# Patient Record
Sex: MALE | Race: WHITE | NOT HISPANIC OR LATINO | Employment: FULL TIME | URBAN - METROPOLITAN AREA
[De-identification: names, ages, dates, MRNs, and addresses within clinical notes are randomized per-mention and may not be internally consistent; named-entity substitution may affect disease eponyms.]

---

## 2022-12-09 ENCOUNTER — TELEPHONE (OUTPATIENT)
Dept: CARDIOLOGY | Facility: MEDICAL CENTER | Age: 57
End: 2022-12-09
Payer: COMMERCIAL

## 2022-12-09 NOTE — TELEPHONE ENCOUNTER
Spoke to patient in regards to obtaining records for NP appointment with Yaneth Melgar. Per patient he was treated by a cardiologist in [place]. Confirmed all recent notes, labs, and cardiac imaging are in Epic. Confirmed with patient appointment time, location, and date.    Will request all cardiac records including lab work from [facility].

## 2022-12-14 ENCOUNTER — TELEPHONE (OUTPATIENT)
Dept: CARDIOLOGY | Facility: MEDICAL CENTER | Age: 57
End: 2022-12-14

## 2022-12-14 ENCOUNTER — OFFICE VISIT (OUTPATIENT)
Dept: CARDIOLOGY | Facility: MEDICAL CENTER | Age: 57
End: 2022-12-14
Payer: COMMERCIAL

## 2022-12-14 VITALS
RESPIRATION RATE: 16 BRPM | SYSTOLIC BLOOD PRESSURE: 131 MMHG | HEIGHT: 74 IN | OXYGEN SATURATION: 95 % | DIASTOLIC BLOOD PRESSURE: 80 MMHG | HEART RATE: 90 BPM | BODY MASS INDEX: 33.88 KG/M2 | WEIGHT: 264 LBS

## 2022-12-14 DIAGNOSIS — E78.00 PURE HYPERCHOLESTEROLEMIA: ICD-10-CM

## 2022-12-14 DIAGNOSIS — Z95.5 HISTORY OF HEART ARTERY STENT: ICD-10-CM

## 2022-12-14 DIAGNOSIS — I25.9 ISCHEMIC HEART DISEASE DUE TO CORONARY ARTERY OBSTRUCTION (HCC): ICD-10-CM

## 2022-12-14 DIAGNOSIS — I25.10 CORONARY ARTERY DISEASE INVOLVING NATIVE CORONARY ARTERY OF NATIVE HEART WITHOUT ANGINA PECTORIS: ICD-10-CM

## 2022-12-14 DIAGNOSIS — I24.0 ISCHEMIC HEART DISEASE DUE TO CORONARY ARTERY OBSTRUCTION (HCC): ICD-10-CM

## 2022-12-14 DIAGNOSIS — I10 PRIMARY HYPERTENSION: ICD-10-CM

## 2022-12-14 DIAGNOSIS — Z86.79 HISTORY OF HEART FAILURE: ICD-10-CM

## 2022-12-14 DIAGNOSIS — E11.00 TYPE 2 DIABETES MELLITUS WITH HYPEROSMOLARITY WITHOUT COMA, WITHOUT LONG-TERM CURRENT USE OF INSULIN (HCC): ICD-10-CM

## 2022-12-14 PROCEDURE — 99205 OFFICE O/P NEW HI 60 MIN: CPT | Performed by: NURSE PRACTITIONER

## 2022-12-14 PROCEDURE — 93000 ELECTROCARDIOGRAM COMPLETE: CPT | Performed by: INTERNAL MEDICINE

## 2022-12-14 RX ORDER — PANTOPRAZOLE SODIUM 40 MG/1
TABLET, DELAYED RELEASE ORAL
COMMUNITY
Start: 2022-12-12

## 2022-12-14 RX ORDER — CARVEDILOL 12.5 MG/1
12.5 TABLET ORAL 2 TIMES DAILY WITH MEALS
COMMUNITY
Start: 2022-12-12

## 2022-12-14 RX ORDER — SPIRONOLACTONE 25 MG/1
12.5 TABLET ORAL DAILY
COMMUNITY
Start: 2022-12-12

## 2022-12-14 RX ORDER — CLOPIDOGREL BISULFATE 75 MG/1
75 TABLET ORAL DAILY
COMMUNITY
Start: 2022-12-12 | End: 2023-01-26

## 2022-12-14 RX ORDER — SACUBITRIL AND VALSARTAN 24; 26 MG/1; MG/1
1 TABLET, FILM COATED ORAL 2 TIMES DAILY
COMMUNITY
Start: 2022-12-12 | End: 2023-04-06

## 2022-12-14 RX ORDER — ATORVASTATIN CALCIUM 80 MG/1
80 TABLET, FILM COATED ORAL DAILY
COMMUNITY
Start: 2022-12-12

## 2022-12-14 RX ORDER — FUROSEMIDE 40 MG/1
40 TABLET ORAL DAILY
COMMUNITY
Start: 2022-12-12

## 2022-12-14 NOTE — PROGRESS NOTES
Cardiology New Consultation Note    Date of note:    12/14/2022  Primary Care Provider: NOMAN Vaca    Name:             Yaniv Garvey  YOB: 1965  MRN:               [id number]    CC: Hx of cardiac stents, CHF     Patient HPI:   Yaniv Garvey is a 57 y.o. male with current medical problems including HLD, DM2, atherosclerotic heart disease with Hx of 4 cardiac stents in 2019, HTN, and ischemic cardiomyopathy    Patient endorses medication compliance.  BP slightly elevated today at 144/90.  At home he states his BP meds are low 130s/80s.  He does have recent labs that he got from PCP at [facility], hemoglobin A1c was 11.3 in September, has not had repeat labs yet since.  States his blood sugars have been in the high 300s-400s recently.    Had cardiac care at [facility] [place], Dr. Ruano, had 4 cardiac stents in 2019. Was told he had CHF, not sure of EF.    He denies palpitations, chest pain, shortness of breath, dyspnea on exertion, dizziness or syncopal episodes, orthopnea, PND, lower extremity swelling, and recent weight gain. Did have swelling in legs.     He still remains of Plavix. No bleeding in blood in urine/stool.    Cardiovascular Risk Factors:  1. Smoking status: never  2. Type II Diabetes Mellitus:  No results found for: HBA1C  3. Hypertension: Yes  4. Dyslipidemia: Yes No results found for: CHOLSTRLTOT, LDL, HDL, TRIGLYCERIDE  5. Family history of early Coronary Artery Disease in a first degree relative (Male less than 55 years of age; Female less than 65 years of age): Father @ 63  6.  Obesity and/or Metabolic Syndrome: BMI 33  7.  Sedentary lifestyle: 15 mins per day on bike, walks with dog    Review of systems:  All others systems reviewed and negative except for what is outlined in the above HPI    No past medical history on file.  No past surgical history on file.  No family history on file.  Social History     Socioeconomic History     "Marital status:      Spouse name: Not on file    Number of children: Not on file    Years of education: Not on file    Highest education level: Not on file   Occupational History    Not on file   Tobacco Use    Smoking status: Never    Smokeless tobacco: Never   Substance and Sexual Activity    Alcohol use: Yes     Comment: 2-3 weekly    Drug use: Never    Sexual activity: Not on file   Other Topics Concern    Not on file   Social History Narrative    Not on file     Social Determinants of Health     Financial Resource Strain: Not on file   Food Insecurity: Not on file   Transportation Needs: Not on file   Physical Activity: Not on file   Stress: Not on file   Social Connections: Not on file   Intimate Partner Violence: Not on file   Housing Stability: Not on file     No Known Allergies  Current Outpatient Medications   Medication Sig Dispense Refill    atorvastatin (LIPITOR) 80 MG tablet Take 80 mg by mouth every day.      carvedilol (COREG) 12.5 MG Tab Take 12.5 mg by mouth 2 times a day with meals.      clopidogrel (PLAVIX) 75 MG Tab Take 75 mg by mouth every day.      furosemide (LASIX) 40 MG Tab Take 40 mg by mouth every day.      metFORMIN (GLUCOPHAGE) 500 MG Tab 2 tablets am. 1 tablet pm      pantoprazole (PROTONIX) 40 MG Tablet Delayed Response TAKE 1 TABLET BY MOUTH ONCE DAILY WITH BREAKFAST      ENTRESTO 24-26 MG Tab Take 1 Tablet by mouth 2 times a day.      spironolactone (ALDACTONE) 25 MG Tab Take 12.5 mg by mouth every day.      Omega-3 Fatty Acids (FISH OIL PO) Take  by mouth.      MAGNESIUM PO Take  by mouth.      aspirin EC (ECOTRIN) 81 MG Tablet Delayed Response Take 81 mg by mouth every day.      Empagliflozin 10 MG Tab Take 1 Tablet by mouth every day. 30 Tablet 3     No current facility-administered medications for this visit.     Physical Exam:  Ambulatory Vitals  /80   Pulse 90   Resp 16   Ht 1.88 m (6' 2\")   Wt 120 kg (264 lb)   SpO2 95%    BP Readings from Last 4 " Encounters:   22 131/80       Weight/BMI: Body mass index is 33.9 kg/m².  Wt Readings from Last 4 Encounters:   22 120 kg (264 lb)       General: No apparent distress. Well nourished.   Neck: No JVD. No caroid bruits, trachea midline  Lungs: CTAB. Normal effort, without crackles/rhonchi, no wheezing  Heart: RRR. Normal S1/S2, no murmur, no rub. no lower extremity edema. 2+ radial pulses, 2+ DT pulses  Ext: No clubbing or cyanosis.  Abdomen: soft, non tender, non distended, no rosario hepatomegaly.  Neurological: No focal deficits, no facial asymmetry.  Normal speech.  Psychiatric: Appropriate affect, alert and oriented x 4.   Skin: Warm and dry, no rash.    Lab Data Review:  No results found for: CHOLSTRLTOT, LDL, HDL, TRIGLYCERIDE    No results found for: SODIUM, POTASSIUM, CHLORIDE, CO2, GLUCOSE, BUN, CREATININE, BUNCREATRAT, GLOMRATE  No results found for: ALKPHOSPHAT, ASTSGOT, ALTSGPT, TBILIRUBIN   No results found for: WBC    Cardiac Imaging and Procedures Review:      EKG 22: My Personal interpretation reveals SR 89    Assessment and Clinical Decision Makin. Ischemic heart disease due to coronary artery obstruction (HCC)  Empagliflozin 10 MG Tab    Comp Metabolic Panel    TSH WITH REFLEX TO FT4    Lipid Profile    CBC WITHOUT DIFFERENTIAL      2. Coronary artery disease involving native coronary artery of native heart without angina pectoris  Empagliflozin 10 MG Tab    Comp Metabolic Panel    TSH WITH REFLEX TO FT4    Lipid Profile    CBC WITHOUT DIFFERENTIAL      3. History of heart artery stent        4. Pure hypercholesterolemia  EKG    Empagliflozin 10 MG Tab    Comp Metabolic Panel    TSH WITH REFLEX TO FT4    Lipid Profile    CBC WITHOUT DIFFERENTIAL      5. Primary hypertension  Empagliflozin 10 MG Tab    Comp Metabolic Panel    TSH WITH REFLEX TO FT4    Lipid Profile    CBC WITHOUT DIFFERENTIAL      6. Type 2 diabetes mellitus with hyperosmolarity without coma, without long-term  current use of insulin (HCC)  Empagliflozin 10 MG Tab    Comp Metabolic Panel    TSH WITH REFLEX TO FT4    Lipid Profile    CBC WITHOUT DIFFERENTIAL    Referral to Endocrinology    HEMOGLOBIN A1C (Glycohemoglobin GHB Total/A1C with MBG Estimate)        The following treatment plan was discussed    Ischemic heart disease due to coronary obstruction  CAD  History of heart artery stent  Pure hypercholesterolemia  -No records were requested prior to this appointment  -Request records from [place], [place], cardiology  -Daily aspirin 81 mg daily  -May be able to stop Plavix 75 mg given that he has been on this longer than 1 year, wait to review cardiology records for recommendations  -High intensity atorvastatin 80 mg  -DL goal < 70  -Obtain labs CMP and lipids    Hx heart failure  -No current heart failure symptoms, euvolemic per exam  -Patient unsure of degree of heart failure  -Awaiting records from cardiology, per above  -New current GDMT to include BB, Arni, spironolactone, furosemide  -Initiate Jardiance 10mg given DM2, labs in 1 month    HTN  -Home monitoring, get BP cuff to next appointment  -Follow-up with BPs in 6 weeks  -If home BP still > 130/80 can increase Entresto    DM2  -Requesting referral to endocrinology, placed  -Obtain hemoglobin A1c prior to appointment    Plan reviewed in detail with the patient, verbalizes understanding and is in agreement.  Pt is to follow up with myself in 6 weeks  Encouraged Pt to follow up with us over the phone or electronically using my [employer] as cardiac issues/concerns arise.      PLEASE NOTE: This dictation was created using voice recognition software. I have made every reasonable attempt to correct obvious errors, but I expect that there are errors of grammar and possibly content that I did not discover before finalizing the note.       ANGELICA Boggs.   [facility]  [phone]    Collaborating Physician:   Aric

## 2022-12-15 LAB — EKG IMPRESSION: NORMAL

## 2022-12-15 NOTE — TELEPHONE ENCOUNTER
Faxed STAT records request to previous Cardiologist in [place].  Dr. Ruano     Ph # [phone]  fax# [phone]

## 2022-12-15 NOTE — PATIENT INSTRUCTIONS
Bring in BP cuff to next visit to double check   Labs in 1 month   See me in 6 weeks   Call [phone] to find a new PCP with [facility]    Checking Blood Pressure:  -Blood pressure cuff, spend in the $40-65, with good return policy  -It should be automatic, upper arm, measure your arm to get the correct size, probably adult Large but your arm should be under 16.5 cm. If you need an XL cuff, you will have to have it special ordered from a pharmacy or durable medical equipment company.  -Put the cuff in place, rest arm on table near height of your heart, sit quietly for 5 min, legs uncrossed, with back support, then take your blood pressure, write it down, keep a log  -Check no more than 1 time day, maybe 4-5 times per week, try different times of day.  -Can bring your cuff to at least one appointment where it can be calibrated to a manual cuff if you are concerned.  -Goal blood pressure is at least under 130/80, ideally under 120/80.  If you think your BP is overall too high, let us know in the office, we can adjust medications, can use Iizuu or call the [place] office: [phone].    Salt=sodium=sea salt, guidelines say stay under 2,500 mg daily but I ask for under 4,000 mg daily.  Get salt smart, start looking at labels, count it up.  Salt is hidden in everything, salad dressing, sauces, cheese, most canned food, any processed meat.      A - Antiplatelet - Aspirin 81 mg daily or other antiplatelets (clopidogrel (PLAVIX), prasrugrel (EFFIENT), ticagrelor (BRILINTA) reduce your risk.   B - Blood Pressure Control - reduces your risk or heart attack and stroke.  C - Cholesterol Management - statins reduce your risk; for those that are intolerant to statins, there are alternatives.  D - Diet - Cardiac rehab diets, Cardiosmart.org.  E - Exercise - at least 5 hours of moderate exercise weekly  (typical brisk walking or similar activity).  F - Fats - VASCEPA,  or Fish oil (if Vascepa too expensive) for elevated triglycerides  (REDUCE IT trial showed reduction from 22% 5 year MACE to 17%).  G - Good Vibes - meditation, exercise, yoga, mindfulness, stress reduction.  H - Heart Failure - betablockers, sucubatril (ENTRESTO) 16% less risk of dying over 3 years, spironolactone, dapagliflozin (FARXIGA) 17% less risk of dying over 2 years, CRT +/- ICD.  I - Inflammation - Colchicine in the LoDoCo2 study in 2020 reduced the risk of heart attack by 30% in 2.5 year follow up.  R - Rehab - Cardiac rehab reduces risk of dying by 13-24% and need to go to the hospital by 30% within the first year.  S - Smoking - never smoke, if you do smoke ask for help to quit for good.  T - Type II Diabetes - pills empagliflozin (JARDIANCE) 38% less risk of dying over 4 years, and/or weekly injections liraglutide (Victoza), semaglutide (Ozempic), dulaglutide (Trulicity) ~26% less risk of MACE in 2 years.  V - Vaccines - Annual flu shot and COVID vaccine reduces the risk of serious cardiovascular complications from these deadly infections.  W - Weight - maintain a healthy weight.      Work on at least 1.5 - 5 hours a week of moderate exercise    Please look into the following diets and incorporate them into your diet  LOW SALT DIET   KEEP YOUR SODIUM EQUAL TO CALORIES AND NO MORE THAN DOUBLE THE CALORIES FOR A LOW SALT DIET    Cardiosmart.org - great resource for American College of Cardiology on heart disease prevention and treatment    FOR TREATMENT OR PREVENTION OF CORONARY ARTERY DISEASE  These three programs are approved by Medicare/Insurers for those with heart disease  [place] - [facility]  Dr. Huynh's Program for Reversing Heart Disease - José Miguel Gayle Cardiologist vegetarian-based  [facility] - [place]-based mind-body Program    This is a commonly referenced Program  Dr Montiel - [place] [facility] [employer] (book and documentary) - vegetarian-based    FOR TREATMENT OF BLOOD PRESSURE  DASH DIET - American  Heart Association for treatment of HYPERTENSION    FOR TREATMENT OF BAD CHOLESTEROL/FATS  REDUCE PROCESSED SUGAR AS MUCH AS POSSIBLE  INCREASE WHOLE GRAINS/VEGETABLES  INCREASE FIBER    Lowering total cholesterol and LDL (bad) cholesterol:  - Eat leaner cuts of meat, or eliminate altogether if possible red meat, and frequently substitute fish or chicken.  - Limit saturated fat to no more than 7-10% of total calories no more than 10 g per day is recommended. Some sources of saturated fat include butter, animal fats, hydrogenated vegetable fats and oils, many desserts, whole milk dairy products.  - Replaced saturated fats with polyunsaturated fats and monounsaturated fats. Foods high in monounsaturated fat include nuts, canola oil, avocados, and olives.  - Limit trans fat (processed foods) and replaced with fresh fruits and vegetables  - Recommend nonfat dairy products  - Increase substantially the amount of soluble fiber intake (legumes such as beans, fruit, whole grains).  - Consider nutritional supplements: plant sterile spreads such as Benecol, fish oil,  flaxseed oil, omega-3 acids capsules 1000 mg twice a day, or viscous fiber such as Metamucil  - Attain ideal weight and regular exercise (at least 30 minutes per day of moderate exercise)  ASK ABOUT STATIN OR NON STATIN MEDICATION TO REDUCE YOUR LDL AND HEART RISK    Lowering triglycerides:  - Reduce intake of simple sugar: Desserts, candy, pastries, honey, sodas, sugared cereals, yogurt, Gatorade, sports bars, canned fruit, smoothies, fruit juice, coffee drinks  - Reduced intake of refined starches: Refined Pasta, most bread  - Reduce or abstain from alcohol  - Increase omega-3 fatty acids: Sylvia, Trout, Mackerel, Herring, Albacore tuna and supplements  - Attain ideal weight and regular exercise (at least 30 minutes per day of moderate exercise)  ASK ABOUT PURIFIED OMEGA 3 EPA or FISH OIL TO REDUCE YOUR TG AND HEART RISK    Elevating HDL (good) cholesterol:  -  Increase physical activity  - Increase omega-3 fatty acids and supplements as listed above  - Incorporating appropriate amounts of monounsaturated fats such as nuts, olive oil, canola oil, avocados, olives  - Stop smoking  - Attain ideal weight and regular exercise (at least 30 minutes per day of moderate exercise)

## 2023-01-26 ENCOUNTER — OFFICE VISIT (OUTPATIENT)
Dept: CARDIOLOGY | Facility: MEDICAL CENTER | Age: 58
End: 2023-01-26
Payer: COMMERCIAL

## 2023-01-26 VITALS
WEIGHT: 260 LBS | HEART RATE: 90 BPM | HEIGHT: 73 IN | RESPIRATION RATE: 18 BRPM | SYSTOLIC BLOOD PRESSURE: 138 MMHG | DIASTOLIC BLOOD PRESSURE: 82 MMHG | OXYGEN SATURATION: 96 % | BODY MASS INDEX: 34.46 KG/M2

## 2023-01-26 DIAGNOSIS — I10 PRIMARY HYPERTENSION: ICD-10-CM

## 2023-01-26 DIAGNOSIS — I24.0 ISCHEMIC HEART DISEASE DUE TO CORONARY ARTERY OBSTRUCTION (HCC): ICD-10-CM

## 2023-01-26 DIAGNOSIS — I50.20 ACC/AHA STAGE C HEART FAILURE WITH REDUCED EJECTION FRACTION (HCC): ICD-10-CM

## 2023-01-26 DIAGNOSIS — I25.9 ISCHEMIC HEART DISEASE DUE TO CORONARY ARTERY OBSTRUCTION (HCC): ICD-10-CM

## 2023-01-26 DIAGNOSIS — E11.00 TYPE 2 DIABETES MELLITUS WITH HYPEROSMOLARITY WITHOUT COMA, WITHOUT LONG-TERM CURRENT USE OF INSULIN (HCC): ICD-10-CM

## 2023-01-26 PROCEDURE — 99214 OFFICE O/P EST MOD 30 MIN: CPT | Performed by: NURSE PRACTITIONER

## 2023-01-26 RX ORDER — DAPAGLIFLOZIN 10 MG/1
10 TABLET, FILM COATED ORAL DAILY
Qty: 30 TABLET | Refills: 11 | Status: SHIPPED | OUTPATIENT
Start: 2023-01-26 | End: 2023-04-06

## 2023-01-26 NOTE — PROGRESS NOTES
Cardiology Follow-Up Note    Date of note:    1/26/2023  Primary Care Provider: NOMAN Vaca    Name:             Yaniv Garvey  YOB: 1965  MRN:               [id number]    CC: Hx of cardiac stents, CHF     Patient HPI:   Yaniv Garvey is a 57 y.o. male with current medical problems including HLD, DM2, atherosclerotic heart disease with Hx of 4 cardiac stents in 2019, HTN, and ischemic cardiomyopathy with recovered EF most recently 40-45%    Interim events  Cardiology records now within media tab, reviewed.  He was unable to start Jardiance medication, never sent to him from his mail order pharmacy.  He denies palpitations, chest pain, shortness of breath, dyspnea on exertion, dizziness or syncopal episodes, orthopnea, PND, lower extremity swelling, and recent weight gain.   Taking furosemide daily  Still on DAPT.  Still working on obtaining PCP and Endocrinologist  Not yet completed labs I ordered at last visit.  He denies palpitations, chest pain, shortness of breath, dyspnea on exertion, dizziness or syncopal episodes, orthopnea, PND, lower extremity swelling, and recent weight gain.     Per my last office visit note on 12/15/22  Patient endorses medication compliance.  BP slightly elevated today at 144/90.  At home he states his BP meds are low 130s/80s.  He does have recent labs that he got from PCP at [facility], hemoglobin A1c was 11.3 in September, has not had repeat labs yet since.  States his blood sugars have been in the high 300s-400s recently.    Had cardiac care at [facility], Dr. Ruano, had 4 cardiac stents in 2019. Was told he had CHF, not sure of EF.    He denies palpitations, chest pain, shortness of breath, dyspnea on exertion, dizziness or syncopal episodes, orthopnea, PND, lower extremity swelling, and recent weight gain. Did have swelling in legs.     He still remains of Plavix. No bleeding in blood in  urine/stool.    Cardiovascular Risk Factors:  1. Smoking status: never  2. Type II Diabetes Mellitus:  No results found for: HBA1C  3. Hypertension: Yes  4. Dyslipidemia: Yes No results found for: CHOLSTRLTOT, LDL, HDL, TRIGLYCERIDE  5. Family history of early Coronary Artery Disease in a first degree relative (Male less than 55 years of age; Female less than 65 years of age): Father @ 63  6.  Obesity and/or Metabolic Syndrome: BMI 33  7.  Sedentary lifestyle: 15 mins per day on bike, walks with dog    Review of systems:  All others systems reviewed and negative except for what is outlined in the above HPI    History reviewed. No pertinent past medical history.  History reviewed. No pertinent surgical history.  History reviewed. No pertinent family history.  Social History     Socioeconomic History    Marital status:      Spouse name: Not on file    Number of children: Not on file    Years of education: Not on file    Highest education level: Not on file   Occupational History    Not on file   Tobacco Use    Smoking status: Never    Smokeless tobacco: Never   Vaping Use    Vaping Use: Never used   Substance and Sexual Activity    Alcohol use: Yes     Alcohol/week: 3.0 oz     Types: 5 Standard drinks or equivalent per week     Comment: 2-3 weekly    Drug use: Never    Sexual activity: Not on file   Other Topics Concern    Not on file   Social History Narrative    Not on file     Social Determinants of Health     Financial Resource Strain: Not on file   Food Insecurity: Not on file   Transportation Needs: Not on file   Physical Activity: Not on file   Stress: Not on file   Social Connections: Not on file   Intimate Partner Violence: Not on file   Housing Stability: Not on file     No Known Allergies  Current Outpatient Medications   Medication Sig Dispense Refill    dapagliflozin propanediol (FARXIGA) 10 MG Tab Take 1 Tablet by mouth every day. 30 Tablet 11    atorvastatin (LIPITOR) 80 MG tablet Take 80 mg by  "mouth every day.      carvedilol (COREG) 12.5 MG Tab Take 12.5 mg by mouth 2 times a day with meals.      furosemide (LASIX) 40 MG Tab Take 40 mg by mouth every day.      metFORMIN (GLUCOPHAGE) 500 MG Tab 2 tablets am. 1 tablet pm      pantoprazole (PROTONIX) 40 MG Tablet Delayed Response TAKE 1 TABLET BY MOUTH ONCE DAILY WITH BREAKFAST      ENTRESTO 24-26 MG Tab Take 1 Tablet by mouth 2 times a day.      spironolactone (ALDACTONE) 25 MG Tab Take 12.5 mg by mouth every day.      Omega-3 Fatty Acids (FISH OIL PO) Take  by mouth.      MAGNESIUM PO Take  by mouth.      aspirin EC (ECOTRIN) 81 MG Tablet Delayed Response Take 81 mg by mouth every day.       No current facility-administered medications for this visit.     Physical Exam:  Ambulatory Vitals  /82 (BP Location: Left arm, Patient Position: Sitting, BP Cuff Size: Large adult)   Pulse 90   Resp 18   Ht 1.854 m (6' 1\")   Wt 118 kg (260 lb)   SpO2 96%    BP Readings from Last 4 Encounters:   01/26/23 138/82   12/14/22 131/80       Weight/BMI: Body mass index is 34.3 kg/m².  Wt Readings from Last 4 Encounters:   01/26/23 118 kg (260 lb)   12/14/22 120 kg (264 lb)       General: No apparent distress. Well nourished.   Neck: No JVD. No caroid bruits, trachea midline  Lungs: CTAB. Normal effort, without crackles/rhonchi, no wheezing  Heart: RRR. Normal S1/S2, no murmur, no rub. no lower extremity edema. 2+ radial pulses, 2+ DT pulses  Ext: No clubbing or cyanosis.  Abdomen: soft, non tender, non distended, no rosario hepatomegaly.  Neurological: No focal deficits, no facial asymmetry.  Normal speech.  Psychiatric: Appropriate affect, alert and oriented x 4.   Skin: Warm and dry, no rash.    Lab Data Review:  No results found for: CHOLSTRLTOT, LDL, HDL, TRIGLYCERIDE    No results found for: SODIUM, POTASSIUM, CHLORIDE, CO2, GLUCOSE, BUN, CREATININE, BUNCREATRAT, GLOMRATE  No results found for: ALKPHOSPHAT, ASTSGOT, ALTSGPT, TBILIRUBIN   No results found for: " WBC    Cardiac Imaging and Procedures Review:      EKG 22: My Personal interpretation reveals SR 89    Assessment and Clinical Decision Makin. Ischemic heart disease due to coronary artery obstruction (HCC)  EC-ECHOCARDIOGRAM COMPLETE W/O CONT    dapagliflozin propanediol (FARXIGA) 10 MG Tab      2. ACC/AHA stage C heart failure with reduced ejection fraction (HCC)  EC-ECHOCARDIOGRAM COMPLETE W/O CONT    dapagliflozin propanediol (FARXIGA) 10 MG Tab      3. Type 2 diabetes mellitus with hyperosmolarity without coma, without long-term current use of insulin (HCC)        4. Primary hypertension            The following treatment plan was discussed    Ischemic heart disease due to coronary obstruction  CAD  History of heart artery stent  Pure hypercholesterolemia  -Requested records now available in medica tab, reviewed  -Daily aspirin 81 mg daily  -stop Plavix 75 mg   -High intensity atorvastatin 80 mg  -LDL goal < 70  -Obtain labs CMP and lipids     ACC/AHA stage C heart failure with reduced EF  -HF as low as 10-15% prior to cardiac stenting, most recent EF in  was 40-45%  -No current heart failure symptoms, euvolemic per exam  -Continue GDMT to include BB, Arni, spironolactone, furosemide  -Initiate Farxiga 10mg given DM2, labs in 1 month    HTN  -Controlled today in office  -If home BP still > 130/80 can increase Entresto    DM2  -Requesting referral to endocrinology, placed  -Obtain hemoglobin A1c prior to appointment    Plan reviewed in detail with the patient, verbalizes understanding and is in agreement.  Pt is to follow up with myself in 6 weeks, discussed the importance of establishing with PCP  Encouraged Pt to follow up with us over the phone or electronically using my [employer] as cardiac issues/concerns arise.      PLEASE NOTE: This dictation was created using voice recognition software. I have made every reasonable attempt to correct obvious errors, but I expect that there are errors of  grammar and possibly content that I did not discover before finalizing the note.       NOMAN Boggs   [facility]  [phone]    Collaborating Physician: Dr. Corbett

## 2023-03-24 ENCOUNTER — HOSPITAL ENCOUNTER (OUTPATIENT)
Dept: CARDIOLOGY | Facility: MEDICAL CENTER | Age: 58
End: 2023-03-24
Attending: NURSE PRACTITIONER
Payer: COMMERCIAL

## 2023-03-24 DIAGNOSIS — I25.9 ISCHEMIC HEART DISEASE DUE TO CORONARY ARTERY OBSTRUCTION (HCC): ICD-10-CM

## 2023-03-24 DIAGNOSIS — I24.0 ISCHEMIC HEART DISEASE DUE TO CORONARY ARTERY OBSTRUCTION (HCC): ICD-10-CM

## 2023-03-24 DIAGNOSIS — I50.20 ACC/AHA STAGE C HEART FAILURE WITH REDUCED EJECTION FRACTION (HCC): ICD-10-CM

## 2023-03-24 LAB
LV EJECT FRACT  99904: 65
LV EJECT FRACT MOD 2C 99903: 45.48
LV EJECT FRACT MOD 4C 99902: 61.01
LV EJECT FRACT MOD BP 99901: 55.81

## 2023-03-24 PROCEDURE — 93306 TTE W/DOPPLER COMPLETE: CPT

## 2023-03-24 PROCEDURE — 93306 TTE W/DOPPLER COMPLETE: CPT | Mod: 26 | Performed by: INTERNAL MEDICINE

## 2023-03-28 ENCOUNTER — TELEPHONE (OUTPATIENT)
Dept: CARDIOLOGY | Facility: MEDICAL CENTER | Age: 58
End: 2023-03-28
Payer: COMMERCIAL

## 2023-03-28 NOTE — TELEPHONE ENCOUNTER
Called pt in regards to lab work that was ordered at previous OV. Patient states he will get them done prior to appointment. Pt has follow up appointment scheduled with MR on 04/06/23.

## 2023-04-06 ENCOUNTER — OFFICE VISIT (OUTPATIENT)
Dept: CARDIOLOGY | Facility: MEDICAL CENTER | Age: 58
End: 2023-04-06
Payer: COMMERCIAL

## 2023-04-06 VITALS
DIASTOLIC BLOOD PRESSURE: 72 MMHG | OXYGEN SATURATION: 93 % | SYSTOLIC BLOOD PRESSURE: 138 MMHG | WEIGHT: 257 LBS | BODY MASS INDEX: 34.06 KG/M2 | HEIGHT: 73 IN | RESPIRATION RATE: 16 BRPM | HEART RATE: 91 BPM

## 2023-04-06 DIAGNOSIS — I25.9 ISCHEMIC HEART DISEASE DUE TO CORONARY ARTERY OBSTRUCTION (HCC): ICD-10-CM

## 2023-04-06 DIAGNOSIS — E11.00 TYPE 2 DIABETES MELLITUS WITH HYPEROSMOLARITY WITHOUT COMA, WITHOUT LONG-TERM CURRENT USE OF INSULIN (HCC): ICD-10-CM

## 2023-04-06 DIAGNOSIS — I50.20 ACC/AHA STAGE C HEART FAILURE WITH REDUCED EJECTION FRACTION (HCC): ICD-10-CM

## 2023-04-06 DIAGNOSIS — I25.10 CORONARY ARTERY DISEASE INVOLVING NATIVE CORONARY ARTERY OF NATIVE HEART WITHOUT ANGINA PECTORIS: ICD-10-CM

## 2023-04-06 DIAGNOSIS — I10 PRIMARY HYPERTENSION: ICD-10-CM

## 2023-04-06 DIAGNOSIS — I24.0 ISCHEMIC HEART DISEASE DUE TO CORONARY ARTERY OBSTRUCTION (HCC): ICD-10-CM

## 2023-04-06 DIAGNOSIS — E78.00 PURE HYPERCHOLESTEROLEMIA: ICD-10-CM

## 2023-04-06 PROCEDURE — 99214 OFFICE O/P EST MOD 30 MIN: CPT | Performed by: NURSE PRACTITIONER

## 2023-04-06 RX ORDER — EMPAGLIFLOZIN, METFORMIN HYDROCHLORIDE 12.5; 1 MG/1; MG/1
TABLET, EXTENDED RELEASE ORAL 2 TIMES DAILY
COMMUNITY
Start: 2023-02-20

## 2023-04-06 RX ORDER — TIRZEPATIDE 2.5 MG/.5ML
5 INJECTION, SOLUTION SUBCUTANEOUS
COMMUNITY
Start: 2023-03-09

## 2023-04-06 RX ORDER — SACUBITRIL AND VALSARTAN 49; 51 MG/1; MG/1
1 TABLET, FILM COATED ORAL 2 TIMES DAILY
Qty: 60 TABLET | Refills: 11 | Status: SHIPPED | OUTPATIENT
Start: 2023-04-06 | End: 2023-08-04 | Stop reason: SDUPTHER

## 2023-04-06 NOTE — PROGRESS NOTES
Cardiology Follow-Up Note    Date of note:    4/6/2023  Primary Care Provider: NOMAN Vaca    Name:             Yaniv Garvey  YOB: 1965  MRN:               [id number]    CC: Hx of cardiac stents, ischemic cardiomyopathy, HFrecEF    Patient HPI:   Yaniv Garvey is a 57 y.o. male with current medical problems including HLD, DM2, atherosclerotic heart disease with Hx of 4 cardiac stents in 2019, HTN, and ischemic cardiomyopathy with recovered EF most recently 65%, baseline 10-15% in 2019    Interim events  Patient presents today for 3-month follow-up. Seeing endocrinology for DM2, started on Synjardy, did not start Farxiga.    20-30 mins on stationary bike/daily.   BP at home 130-140s/80s on average.  Recent Echo EF 65%    He denies palpitations, chest pain, shortness of breath, dyspnea on exertion, dizziness or syncopal episodes, orthopnea, PND, lower extremity swelling, and recent weight gain.     Per my last office visit note on 1/26/2023  Cardiology records now within media tab, reviewed.  He was unable to start Jardiance medication, never sent to him from his mail order pharmacy.  He denies palpitations, chest pain, shortness of breath, dyspnea on exertion, dizziness or syncopal episodes, orthopnea, PND, lower extremity swelling, and recent weight gain.   Taking furosemide daily  Still on DAPT.  Still working on obtaining PCP and Endocrinologist  Not yet completed labs I ordered at last visit.  He denies palpitations, chest pain, shortness of breath, dyspnea on exertion, dizziness or syncopal episodes, orthopnea, PND, lower extremity swelling, and recent weight gain.     Per my last office visit note on 12/15/22  Patient endorses medication compliance.  BP slightly elevated today at 144/90.  At home he states his BP meds are low 130s/80s.  He does have recent labs that he got from PCP at [facility], hemoglobin A1c was 11.3 in September, has not had repeat labs yet since.  States  his blood sugars have been in the high 300s-400s recently.    Had cardiac care at [facility], Dr. Ruano, had 4 cardiac stents in 2019. Was told he had CHF, not sure of EF.    He denies palpitations, chest pain, shortness of breath, dyspnea on exertion, dizziness or syncopal episodes, orthopnea, PND, lower extremity swelling, and recent weight gain. Did have swelling in legs.     He still remains of Plavix. No bleeding in blood in urine/stool.    Cardiovascular Risk Factors:  1. Smoking status: never  2. Type II Diabetes Mellitus:  No results found for: HBA1C  3. Hypertension: Yes  4. Dyslipidemia: Yes No results found for: CHOLSTRLTOT, LDL, HDL, TRIGLYCERIDE  5. Family history of early Coronary Artery Disease in a first degree relative (Male less than 55 years of age; Female less than 65 years of age): Father @ 63  6.  Obesity and/or Metabolic Syndrome: BMI 33  7.  Sedentary lifestyle: 15 mins per day on bike, walks with dog    Review of systems:  All others systems reviewed and negative except for what is outlined in the above HPI    History reviewed. No pertinent past medical history.  History reviewed. No pertinent surgical history.  History reviewed. No pertinent family history.  Social History     Socioeconomic History    Marital status:      Spouse name: Not on file    Number of children: Not on file    Years of education: Not on file    Highest education level: Not on file   Occupational History    Not on file   Tobacco Use    Smoking status: Never    Smokeless tobacco: Never   Vaping Use    Vaping Use: Never used   Substance and Sexual Activity    Alcohol use: Yes     Alcohol/week: 3.0 oz     Types: 5 Standard drinks or equivalent per week     Comment: 2-3 weekly    Drug use: Never    Sexual activity: Not on file   Other Topics Concern    Not on file   Social History Narrative    Not on file     Social Determinants of Health     Financial Resource Strain: Not on file  "  Food Insecurity: Not on file   Transportation Needs: Not on file   Physical Activity: Not on file   Stress: Not on file   Social Connections: Not on file   Intimate Partner Violence: Not on file   Housing Stability: Not on file     No Known Allergies  Current Outpatient Medications   Medication Sig Dispense Refill    MOUNJARO 2.5 MG/0.5ML Solution Pen-injector Take 5 mL by mouth every 7 days.      SYNJARDY XR 12.5-1000 MG TABLET SR 24 HR Take  by mouth 2 times a day.      sacubitril-valsartan (ENTRESTO) 49-51 MG Tab Take 1 Tablet by mouth 2 times a day. 60 Tablet 11    atorvastatin (LIPITOR) 80 MG tablet Take 80 mg by mouth every day.      carvedilol (COREG) 12.5 MG Tab Take 12.5 mg by mouth 2 times a day with meals.      furosemide (LASIX) 40 MG Tab Take 40 mg by mouth every day.      pantoprazole (PROTONIX) 40 MG Tablet Delayed Response TAKE 1 TABLET BY MOUTH ONCE DAILY WITH BREAKFAST      spironolactone (ALDACTONE) 25 MG Tab Take 12.5 mg by mouth every day.      Omega-3 Fatty Acids (FISH OIL PO) Take  by mouth.      MAGNESIUM PO Take 250 mg by mouth every day.      aspirin EC (ECOTRIN) 81 MG Tablet Delayed Response Take 81 mg by mouth every day.       No current facility-administered medications for this visit.     Physical Exam:  Ambulatory Vitals  /72 (BP Location: Left arm, Patient Position: Sitting, BP Cuff Size: Adult)   Pulse 91   Resp 16   Ht 1.854 m (6' 1\")   Wt 117 kg (257 lb)   SpO2 93%    BP Readings from Last 4 Encounters:   04/06/23 138/72   01/26/23 138/82   12/14/22 131/80       Weight/BMI: Body mass index is 33.91 kg/m².  Wt Readings from Last 4 Encounters:   04/06/23 117 kg (257 lb)   01/26/23 118 kg (260 lb)   12/14/22 120 kg (264 lb)       General: No apparent distress. Well nourished.   Neck: No JVD. No caroid bruits, trachea midline  Lungs: CTAB. Normal effort, without crackles/rhonchi, no wheezing  Heart: RRR. Normal S1/S2, no murmur, no rub. no lower extremity edema. 2+ radial " pulses, 2+ DT pulses  Ext: No clubbing or cyanosis.  Abdomen: soft, non tender, non distended, no rosario hepatomegaly.  Neurological: No focal deficits, no facial asymmetry.  Normal speech.  Psychiatric: Appropriate affect, alert and oriented x 4.   Skin: Warm and dry, no rash.    Lab Data Review:  No results found for: CHOLSTRLTOT, LDL, HDL, TRIGLYCERIDE    No results found for: SODIUM, POTASSIUM, CHLORIDE, CO2, GLUCOSE, BUN, CREATININE, BUNCREATRAT, GLOMRATE  No results found for: ALKPHOSPHAT, ASTSGOT, ALTSGPT, TBILIRUBIN   No results found for: WBC    Cardiac Imaging and Procedures Review:      EKG 22: My Personal interpretation reveals SR 89    Assessment and Clinical Decision Makin. Ischemic heart disease due to coronary artery obstruction (HCC)        2. ACC/AHA stage C heart failure with reduced ejection fraction (HCC)  sacubitril-valsartan (ENTRESTO) 49-51 MG Tab    Basic Metabolic Panel      3. Pure hypercholesterolemia        4. Coronary artery disease involving native coronary artery of native heart without angina pectoris        5. Primary hypertension  sacubitril-valsartan (ENTRESTO) 49-51 MG Tab    Basic Metabolic Panel      6. Type 2 diabetes mellitus with hyperosmolarity without coma, without long-term current use of insulin (HCC)              The following treatment plan was discussed    Ischemic heart disease due to coronary obstruction  CAD  History of heart artery stent  Pure hypercholesterolemia  -Requested records now available in media tab, reviewed  -Daily aspirin 81 mg daily  -High intensity atorvastatin 80 mg  -LDL goal < 70  -Obtain labs CMP and lipids     ACC/AHA stage C heart failure with reduced EF  -HF as low as 10-15% prior to cardiac stenting, most recent EF 65%  -No current heart failure symptoms, euvolemic per exam  -Continue GDMT as tolerated to include BB, Arni, spironolactone, furosemide, SGLT2i Synjardy for diabetes    HTN  -Still elevated above goal  -Increase  Entresto 49-51 mg bid  -BP still > 130/80    DM2  -Requesting referral to endocrinology, placed  -Obtain hemoglobin A1c prior to appointment    Plan reviewed in detail with the patient, verbalizes understanding and is in agreement.  Pt is to follow up with myself in 6 months  Encouraged Pt to follow up with us over the phone or electronically using my MyChart as cardiac issues/concerns arise.      PLEASE NOTE: This dictation was created using voice recognition software. I have made every reasonable attempt to correct obvious errors, but I expect that there are errors of grammar and possibly content that I did not discover before finalizing the note.       Yaneth Melgar A.P.R.N.   [facility]  [phone]

## 2023-04-14 ENCOUNTER — HOSPITAL ENCOUNTER (OUTPATIENT)
Dept: LAB | Facility: MEDICAL CENTER | Age: 58
End: 2023-04-14
Attending: NURSE PRACTITIONER
Payer: COMMERCIAL

## 2023-04-14 DIAGNOSIS — I10 PRIMARY HYPERTENSION: ICD-10-CM

## 2023-04-14 DIAGNOSIS — I50.20 ACC/AHA STAGE C HEART FAILURE WITH REDUCED EJECTION FRACTION (HCC): ICD-10-CM

## 2023-04-14 PROCEDURE — 36415 COLL VENOUS BLD VENIPUNCTURE: CPT

## 2023-04-14 PROCEDURE — 80048 BASIC METABOLIC PNL TOTAL CA: CPT

## 2023-04-15 LAB
ANION GAP SERPL CALC-SCNC: 18 MMOL/L (ref 7–16)
BUN SERPL-MCNC: 19 MG/DL (ref 8–22)
CALCIUM SERPL-MCNC: 8.9 MG/DL (ref 8.5–10.5)
CHLORIDE SERPL-SCNC: 101 MMOL/L (ref 96–112)
CO2 SERPL-SCNC: 20 MMOL/L (ref 20–33)
CREAT SERPL-MCNC: 0.87 MG/DL (ref 0.5–1.4)
GFR SERPLBLD CREATININE-BSD FMLA CKD-EPI: 99 ML/MIN/1.73 M 2
GLUCOSE SERPL-MCNC: 135 MG/DL (ref 65–99)
POTASSIUM SERPL-SCNC: 3.6 MMOL/L (ref 3.6–5.5)
SODIUM SERPL-SCNC: 139 MMOL/L (ref 135–145)

## 2023-08-04 DIAGNOSIS — I50.20 ACC/AHA STAGE C HEART FAILURE WITH REDUCED EJECTION FRACTION (HCC): ICD-10-CM

## 2023-08-04 DIAGNOSIS — I10 PRIMARY HYPERTENSION: ICD-10-CM

## 2023-08-04 NOTE — TELEPHONE ENCOUNTER
Is the patient due for a refill? No    Was the patient seen the past year? Yes    Date of last office visit: 04/06/2023    Does the patient have an upcoming appointment?  Yes   If yes, When? 10/06/2023    Provider to refill:      Does the patients insurance require a 100 day supply?  No     Pharmacy    [facility] - [place], [place] - [address]   [address], [place] [address]   Phone:  [phone]  Fax:  [phone]   JOSE ALFREDO #:  --   SANJAY Reason: --     Outpatient Medication Detail     Disp Refills Start End    sacubitril-valsartan (ENTRESTO) 49-51 MG Tab 60 Tablet 11/11 4/6/2023     Sig - Route: Take 1 Tablet by mouth 2 times a day. - Oral    Sent to pharmacy as: Entresto 49-51 MG Oral Tablet (sacubitril-valsartan)    E-Prescribing Status: Receipt confirmed by pharmacy (4/6/2023  3:24 PM PDT)

## 2023-08-08 RX ORDER — SACUBITRIL AND VALSARTAN 49; 51 MG/1; MG/1
1 TABLET, FILM COATED ORAL 2 TIMES DAILY
Qty: 90 TABLET | Refills: 3 | Status: SHIPPED | OUTPATIENT
Start: 2023-08-08 | End: 2023-08-23 | Stop reason: SDUPTHER

## 2023-08-23 DIAGNOSIS — I10 PRIMARY HYPERTENSION: ICD-10-CM

## 2023-08-23 DIAGNOSIS — I50.20 ACC/AHA STAGE C HEART FAILURE WITH REDUCED EJECTION FRACTION (HCC): ICD-10-CM

## 2023-08-23 RX ORDER — SACUBITRIL AND VALSARTAN 49; 51 MG/1; MG/1
1 TABLET, FILM COATED ORAL 2 TIMES DAILY
Qty: 90 TABLET | Refills: 3 | Status: SHIPPED | OUTPATIENT
Start: 2023-08-23

## 2023-08-23 NOTE — TELEPHONE ENCOUNTER
Is the patient due for a refill? Yes Repreped    Was the patient seen the past year? Yes    Date of last office visit: 04/06/23    Does the patient have an upcoming appointment?  No           Provider to refill: ADD BE    Does the patients insurance require a 100 day supply?  No

## 2023-10-26 ENCOUNTER — APPOINTMENT (OUTPATIENT)
Dept: CARDIOLOGY | Facility: MEDICAL CENTER | Age: 58
End: 2023-10-26
Attending: NURSE PRACTITIONER
Payer: COMMERCIAL

## 2023-11-21 ENCOUNTER — APPOINTMENT (OUTPATIENT)
Dept: URGENT CARE | Facility: PHYSICIAN GROUP | Age: 58
End: 2023-11-21

## 2024-08-09 ENCOUNTER — OFFICE VISIT (OUTPATIENT)
Dept: CARDIOLOGY | Facility: MEDICAL CENTER | Age: 59
End: 2024-08-09
Attending: INTERNAL MEDICINE
Payer: COMMERCIAL

## 2024-08-09 VITALS
SYSTOLIC BLOOD PRESSURE: 112 MMHG | HEART RATE: 88 BPM | DIASTOLIC BLOOD PRESSURE: 68 MMHG | RESPIRATION RATE: 18 BRPM | BODY MASS INDEX: 34.06 KG/M2 | WEIGHT: 257 LBS | HEIGHT: 73 IN | OXYGEN SATURATION: 96 %

## 2024-08-09 DIAGNOSIS — I42.9 CARDIOMYOPATHY, UNSPECIFIED TYPE (HCC): ICD-10-CM

## 2024-08-09 DIAGNOSIS — E78.00 PURE HYPERCHOLESTEROLEMIA: ICD-10-CM

## 2024-08-09 DIAGNOSIS — I25.9 ISCHEMIC HEART DISEASE DUE TO CORONARY ARTERY OBSTRUCTION (HCC): ICD-10-CM

## 2024-08-09 DIAGNOSIS — I24.0 ISCHEMIC HEART DISEASE DUE TO CORONARY ARTERY OBSTRUCTION (HCC): ICD-10-CM

## 2024-08-09 DIAGNOSIS — I25.10 CORONARY ARTERY DISEASE INVOLVING NATIVE CORONARY ARTERY OF NATIVE HEART WITHOUT ANGINA PECTORIS: ICD-10-CM

## 2024-08-09 DIAGNOSIS — I10 PRIMARY HYPERTENSION: ICD-10-CM

## 2024-08-09 DIAGNOSIS — Z95.5 S/P PRIMARY ANGIOPLASTY WITH CORONARY STENT: ICD-10-CM

## 2024-08-09 PROCEDURE — 3078F DIAST BP <80 MM HG: CPT | Performed by: INTERNAL MEDICINE

## 2024-08-09 PROCEDURE — 99213 OFFICE O/P EST LOW 20 MIN: CPT | Performed by: INTERNAL MEDICINE

## 2024-08-09 PROCEDURE — 99214 OFFICE O/P EST MOD 30 MIN: CPT | Performed by: INTERNAL MEDICINE

## 2024-08-09 PROCEDURE — 3074F SYST BP LT 130 MM HG: CPT | Performed by: INTERNAL MEDICINE

## 2024-08-09 ASSESSMENT — ENCOUNTER SYMPTOMS
NAUSEA: 0
PND: 0
BLURRED VISION: 0
DIZZINESS: 0
COUGH: 0
WEIGHT LOSS: 0
PALPITATIONS: 0
DYSPNEA ON EXERTION: 0
NEAR-SYNCOPE: 0
FEVER: 0
IRREGULAR HEARTBEAT: 0
BACK PAIN: 0
WEIGHT GAIN: 0
ALTERED MENTAL STATUS: 0
VOMITING: 0
CONSTIPATION: 0
FLANK PAIN: 0
DECREASED APPETITE: 0
ORTHOPNEA: 0
CLAUDICATION: 0
DIARRHEA: 0
DEPRESSION: 0
HEARTBURN: 0
SHORTNESS OF BREATH: 0
ABDOMINAL PAIN: 0
SYNCOPE: 0

## 2024-08-09 NOTE — PROGRESS NOTES
Cardiology Note    Chief Complaint   Patient presents with    Follow-Up     Ischemic heart disease due to coronary artery obstruction (HCC)    Hypertension     Primary hypertension    Coronary Artery Disease     Coronary artery disease involving native coronary artery of native heart without angina pectoris       History of Present Illness: Yaniv Garvey is a 59 y.o. male PMH HLD, DM2, CAD s/p PCI 2019, HTN, ICM HFrecEF who presents for follow up.    Doing well overall. No active cardiac complaints. Compliant with medications and denies adverse effects. Visited primary care yesterday. Pending annual labs. No toxic social habits. He has been working out more and not limited with cardiovascular symptoms.    Review of Systems   Constitutional: Negative for decreased appetite, fever, malaise/fatigue, weight gain and weight loss.   HENT:  Negative for congestion and nosebleeds.    Eyes:  Negative for blurred vision.   Cardiovascular:  Negative for chest pain, claudication, dyspnea on exertion, irregular heartbeat, leg swelling, near-syncope, orthopnea, palpitations, paroxysmal nocturnal dyspnea and syncope.   Respiratory:  Negative for cough and shortness of breath.    Endocrine: Negative for cold intolerance and heat intolerance.   Skin:  Negative for rash.   Musculoskeletal:  Negative for back pain.   Gastrointestinal:  Negative for abdominal pain, constipation, diarrhea, heartburn, melena, nausea and vomiting.   Genitourinary:  Negative for dysuria, flank pain and hematuria.   Neurological:  Negative for dizziness.   Psychiatric/Behavioral:  Negative for altered mental status and depression.          History reviewed. No pertinent past medical history.      History reviewed. No pertinent surgical history.      Current Outpatient Medications   Medication Sig Dispense Refill    sacubitril-valsartan (ENTRESTO) 49-51 MG Tab Take 1 Tablet by mouth 2 times a day. 90 Tablet 3    MOUNJARO 2.5 MG/0.5ML Solution  Pen-injector Take 5 mL by mouth every 7 days.      SYNJARDY XR 12.5-1000 MG TABLET SR 24 HR Take  by mouth 2 times a day.      atorvastatin (LIPITOR) 80 MG tablet Take 80 mg by mouth every day.      carvedilol (COREG) 12.5 MG Tab Take 12.5 mg by mouth 2 times a day with meals.      furosemide (LASIX) 40 MG Tab Take 40 mg by mouth every day.      pantoprazole (PROTONIX) 40 MG Tablet Delayed Response TAKE 1 TABLET BY MOUTH ONCE DAILY WITH BREAKFAST      spironolactone (ALDACTONE) 25 MG Tab Take 12.5 mg by mouth every day.      Omega-3 Fatty Acids (FISH OIL PO) Take  by mouth.      MAGNESIUM PO Take 250 mg by mouth every day.      aspirin EC (ECOTRIN) 81 MG Tablet Delayed Response Take 81 mg by mouth every day.       No current facility-administered medications for this visit.         No Known Allergies      History reviewed. No pertinent family history.      Social History     Socioeconomic History    Marital status:      Spouse name: Not on file    Number of children: Not on file    Years of education: Not on file    Highest education level: Not on file   Occupational History    Not on file   Tobacco Use    Smoking status: Never    Smokeless tobacco: Never   Vaping Use    Vaping status: Never Used   Substance and Sexual Activity    Alcohol use: Yes     Alcohol/week: 3.0 oz     Types: 5 Standard drinks or equivalent per week     Comment: 2-3 weekly    Drug use: Never    Sexual activity: Not on file   Other Topics Concern    Not on file   Social History Narrative    Not on file     Social Determinants of Health     Financial Resource Strain: Not on file   Food Insecurity: Not on file   Transportation Needs: Not on file   Physical Activity: Not on file   Stress: Not on file   Social Connections: Not on file   Intimate Partner Violence: Not on file   Housing Stability: Not on file         Physical Exam:  Ambulatory Vitals  /68 (BP Location: Left arm, Patient Position: Sitting, BP Cuff Size: Adult)   Pulse  "88   Resp 18   Ht 1.854 m (6' 1\")   Wt 117 kg (257 lb)   SpO2 96%    BP Readings from Last 4 Encounters:   08/09/24 112/68   04/06/23 138/72   01/26/23 138/82   12/14/22 131/80     Weight/BMI:   Vitals:    08/09/24 1311   BP: 112/68   Weight: 117 kg (257 lb)   Height: 1.854 m (6' 1\")    Body mass index is 33.91 kg/m².  Wt Readings from Last 4 Encounters:   08/09/24 117 kg (257 lb)   04/06/23 117 kg (257 lb)   01/26/23 118 kg (260 lb)   12/14/22 120 kg (264 lb)       Physical Exam  Constitutional:       General: He is not in acute distress.  HENT:      Head: Normocephalic and atraumatic.   Eyes:      Conjunctiva/sclera: Conjunctivae normal.      Pupils: Pupils are equal, round, and reactive to light.   Neck:      Vascular: No JVD.   Cardiovascular:      Rate and Rhythm: Normal rate and regular rhythm.      Heart sounds: Normal heart sounds. No murmur heard.     No friction rub. No gallop.   Pulmonary:      Effort: Pulmonary effort is normal. No respiratory distress.      Breath sounds: Normal breath sounds. No wheezing or rales.   Chest:      Chest wall: No tenderness.   Abdominal:      General: Bowel sounds are normal. There is no distension.      Palpations: Abdomen is soft.   Musculoskeletal:      Cervical back: Normal range of motion and neck supple.   Skin:     General: Skin is warm and dry.   Neurological:      Mental Status: He is alert and oriented to person, place, and time.   Psychiatric:         Mood and Affect: Affect normal.         Judgment: Judgment normal.         Lab Data Review:  No results found for: \"CHOLSTRLTOT\", \"LDL\", \"HDL\", \"TRIGLYCERIDE\"    Lab Results   Component Value Date/Time    SODIUM 139 04/14/2023 03:24 PM    POTASSIUM 3.6 04/14/2023 03:24 PM    CHLORIDE 101 04/14/2023 03:24 PM    CO2 20 04/14/2023 03:24 PM    GLUCOSE 135 (H) 04/14/2023 03:24 PM    BUN 19 04/14/2023 03:24 PM    CREATININE 0.87 04/14/2023 03:24 PM     CrCl cannot be calculated (Patient's most recent lab result is " "older than the maximum 7 days allowed.).  No results found for: \"ALKPHOSPHAT\", \"ASTSGOT\", \"ALTSGPT\", \"TBILIRUBIN\"   No results found for: \"WBC\", \"HCT\"  No results found for: \"HBA1C\"  No components found for: \"TROP\"      Cardiac Imaging and Procedures Review:        TTE 3/2023  CONCLUSIONS  No prior study is available for comparison.   Normal left ventricular systolic function.  The left ventricular ejection fraction is visually estimated to be 65%.  Severe concentric left ventricular hypertrophy.  Diastolic function is abnormal, but grade is indeterminate.  Normal right ventricular systolic function.  No significant valvular abnormalities.     Medical Decision Making:  Problem List Items Addressed This Visit       Pure hypercholesterolemia    Coronary artery disease involving native coronary artery of native heart without angina pectoris    Primary hypertension    Ischemic heart disease due to coronary artery obstruction (HCC)    Cardiomyopathy (HCC)    S/P primary angioplasty with coronary stent       CAD / PCI - asymptomatic. Stable. Continue aspirin. Continue regimen.    HLD - threshold goal LDL <70. Continue statin. Pending lipids.     HTN - goal <130/80. Controlled. Continue regimen.    ICM / HFrecEF - stable. Recovered. Continue ARNi, SGLT2i, MRA and BB long term.     It was my pleasure to meet with Mr. Garvey.                       "

## 2024-08-20 ENCOUNTER — TELEPHONE (OUTPATIENT)
Dept: CARDIOLOGY | Facility: MEDICAL CENTER | Age: 59
End: 2024-08-20
Payer: COMMERCIAL

## 2024-08-20 NOTE — LETTER
PROCEDURE/SURGERY CLEARANCE FORM      Encounter Date: 8/20/2024    Patient: Yaniv Garvey  YOB: 1965    CARDIOLOGIST:  Linus Grande M.D.    REFERRING DOCTOR:  No ref. provider found    Procedure/Surgery: Colonoscopy with Deep (Propofol) Sedation    Dear Surgeon or Proceduralist,      Thank you for your request for cardiac stratification of our mutual patient Yaniv Garvey 1965. We have reviewed their [facility] records; and to the best of our understanding this patient has not had stenting, ablation, cardiothoracic surgery or hospitalization for cardiovascular reasons in the past 6 months.  Yaniv Garvey has been seen within the past 18 months and is considered to have non-modifiable cardiac risk for this low-risk procedure/surgery. They may proceed from a cardiovascular standpoint and may hold their antiplatelet/anticoagulation as briefly as possible. Please have patient resume this medication when hemodynamically stable to do so.     Aspirin or Prasugrel   - hold 7 days prior to procedure/surgery, resume when hemodynamically stable      Clopidrogrel or Ticagrelor  - hold 7 days for all neurological procedures, hold 5 days prior to all other procedure/surgery,  resume when hemodynamically stable     Warfarin - hold 7 days for all neurological procedures, hold 5 days prior to all other procedure/surgery and coordinate with [facility] ([phone]) INR testing and dose management.      Pradaxa/Xarelto/Eliquis/Savesya - hold 1 day prior to procedure for low bleeding risk procedure, 2 days for high bleeding risk procedure, or consider holding 3 days or longer for patients with reduced kidney function (CrCl <30mL/min) or spinal/cranial surgeries/procedures.      If they have a mechanical heart valve, please coordinate with [facility]ticoagulation [facility] ([phone]) the proper management of their anticoagulant in the periprocedural or perioperative period.      Some  patients have higher risk for cardiovascular complications or holding medication. If our patient has had prior complications of holding antiplatelet or anticoagulants in the past and we have seen them after these events, we have addressed these concerns with the patient. They are at an unknown degree of increased risk for recurrent complication.  You may hold anticoagulation/antiplatelets for the procedure or surgery if the benefits of the procedure or surgery outweigh this nonmodifiable risk.      If Yaniv Garvey 1965 has new symptoms of heart failure decompensation, unstable arrythmia, or angina please reach out and we will assess the patient.      If you have other patient-specific concerns, please feel free to reach out to the patient's cardiologist directly at [phone].     Thank you,       [facility]        Linus Grande M.D.  Electronically Signed

## 2024-08-20 NOTE — TELEPHONE ENCOUNTER
Last OV: 8.9.2024  Proposed Surgery: Colonoscopy with Deep (Propofol) Sedation  Surgery Date: TBD  Requesting Office Name: [facility]   Fax Number: [phone]  Preference of Location (default is surgery center unless specified by Cardiologist or [facility])  Prior Clearance Addressed: No      Anticoags/Antiplatelets: Aspirin  Anticoags/Antiplatelet managed by Cardiology? YES    Outstanding Cardiac Imaging : No  Stent, Cardiac Devices, or Catheterization: No  Ablation, TAVR/Valve (including open heart), Cardioversion: No  Recent Cardiac Hospitalization: No            When: N/A  History (cardiac history): No past medical history on file.          Surgical Clearance Letter Sent: YES   **Scan clearance request letter into [facility].**

## 2025-03-21 DIAGNOSIS — I25.10 CORONARY ARTERY DISEASE INVOLVING NATIVE CORONARY ARTERY OF NATIVE HEART WITHOUT ANGINA PECTORIS: ICD-10-CM

## 2025-06-13 ENCOUNTER — HOSPITAL ENCOUNTER (OUTPATIENT)
Dept: LAB | Facility: MEDICAL CENTER | Age: 60
End: 2025-06-13
Attending: NURSE PRACTITIONER
Payer: COMMERCIAL

## 2025-06-13 LAB
ALBUMIN SERPL BCP-MCNC: 4.6 G/DL (ref 3.2–4.9)
ALBUMIN/GLOB SERPL: 1.8 G/DL
ALP SERPL-CCNC: 58 U/L (ref 30–99)
ALT SERPL-CCNC: 43 U/L (ref 2–50)
ANION GAP SERPL CALC-SCNC: 15 MMOL/L (ref 7–16)
AST SERPL-CCNC: 21 U/L (ref 12–45)
BASOPHILS # BLD AUTO: 0.4 % (ref 0–1.8)
BASOPHILS # BLD: 0.03 K/UL (ref 0–0.12)
BILIRUB SERPL-MCNC: 1.1 MG/DL (ref 0.1–1.5)
BUN SERPL-MCNC: 23 MG/DL (ref 8–22)
CALCIUM ALBUM COR SERPL-MCNC: 8.8 MG/DL (ref 8.5–10.5)
CALCIUM SERPL-MCNC: 9.3 MG/DL (ref 8.5–10.5)
CHLORIDE SERPL-SCNC: 101 MMOL/L (ref 96–112)
CHOLEST SERPL-MCNC: 141 MG/DL (ref 100–199)
CO2 SERPL-SCNC: 21 MMOL/L (ref 20–33)
CREAT SERPL-MCNC: 1.02 MG/DL (ref 0.5–1.4)
EOSINOPHIL # BLD AUTO: 0.1 K/UL (ref 0–0.51)
EOSINOPHIL NFR BLD: 1.4 % (ref 0–6.9)
ERYTHROCYTE [DISTWIDTH] IN BLOOD BY AUTOMATED COUNT: 44 FL (ref 35.9–50)
FASTING STATUS PATIENT QL REPORTED: NORMAL
GFR SERPLBLD CREATININE-BSD FMLA CKD-EPI: 83 ML/MIN/1.73 M 2
GLOBULIN SER CALC-MCNC: 2.6 G/DL (ref 1.9–3.5)
GLUCOSE SERPL-MCNC: 142 MG/DL (ref 65–99)
HCT VFR BLD AUTO: 45.9 % (ref 42–52)
HDLC SERPL-MCNC: 42 MG/DL
HGB BLD-MCNC: 15.2 G/DL (ref 14–18)
IMM GRANULOCYTES # BLD AUTO: 0.01 K/UL (ref 0–0.11)
IMM GRANULOCYTES NFR BLD AUTO: 0.1 % (ref 0–0.9)
LDLC SERPL CALC-MCNC: 72 MG/DL
LYMPHOCYTES # BLD AUTO: 1.82 K/UL (ref 1–4.8)
LYMPHOCYTES NFR BLD: 26 % (ref 22–41)
MCH RBC QN AUTO: 31.1 PG (ref 27–33)
MCHC RBC AUTO-ENTMCNC: 33.1 G/DL (ref 32.3–36.5)
MCV RBC AUTO: 93.9 FL (ref 81.4–97.8)
MONOCYTES # BLD AUTO: 0.6 K/UL (ref 0–0.85)
MONOCYTES NFR BLD AUTO: 8.6 % (ref 0–13.4)
NEUTROPHILS # BLD AUTO: 4.45 K/UL (ref 1.82–7.42)
NEUTROPHILS NFR BLD: 63.5 % (ref 44–72)
NRBC # BLD AUTO: 0 K/UL
NRBC BLD-RTO: 0 /100 WBC (ref 0–0.2)
PLATELET # BLD AUTO: 208 K/UL (ref 164–446)
PMV BLD AUTO: 9.6 FL (ref 9–12.9)
POTASSIUM SERPL-SCNC: 4.1 MMOL/L (ref 3.6–5.5)
PROT SERPL-MCNC: 7.2 G/DL (ref 6–8.2)
RBC # BLD AUTO: 4.89 M/UL (ref 4.7–6.1)
SODIUM SERPL-SCNC: 137 MMOL/L (ref 135–145)
TRIGL SERPL-MCNC: 134 MG/DL (ref 0–149)
TSH SERPL DL<=0.005 MIU/L-ACNC: 0.99 UIU/ML (ref 0.38–5.33)
WBC # BLD AUTO: 7 K/UL (ref 4.8–10.8)

## 2025-06-13 PROCEDURE — 80061 LIPID PANEL: CPT

## 2025-06-13 PROCEDURE — 85025 COMPLETE CBC W/AUTO DIFF WBC: CPT

## 2025-06-13 PROCEDURE — 36415 COLL VENOUS BLD VENIPUNCTURE: CPT

## 2025-06-13 PROCEDURE — 84443 ASSAY THYROID STIM HORMONE: CPT

## 2025-06-13 PROCEDURE — 84153 ASSAY OF PSA TOTAL: CPT

## 2025-06-13 PROCEDURE — 80053 COMPREHEN METABOLIC PANEL: CPT

## 2025-06-16 LAB
PSA FREE MFR SERPL: NORMAL %
PSA FREE SERPL-MCNC: NORMAL NG/ML
PSA SERPL-MCNC: 1 NG/ML (ref 0–4)